# Patient Record
Sex: MALE | Race: WHITE | NOT HISPANIC OR LATINO
[De-identification: names, ages, dates, MRNs, and addresses within clinical notes are randomized per-mention and may not be internally consistent; named-entity substitution may affect disease eponyms.]

---

## 2022-11-08 ENCOUNTER — NON-APPOINTMENT (OUTPATIENT)
Age: 62
End: 2022-11-08

## 2022-11-08 ENCOUNTER — APPOINTMENT (OUTPATIENT)
Dept: OTOLARYNGOLOGY | Facility: CLINIC | Age: 62
End: 2022-11-08

## 2022-11-08 VITALS
HEIGHT: 72 IN | HEART RATE: 84 BPM | WEIGHT: 237 LBS | BODY MASS INDEX: 32.1 KG/M2 | SYSTOLIC BLOOD PRESSURE: 146 MMHG | DIASTOLIC BLOOD PRESSURE: 89 MMHG

## 2022-11-08 DIAGNOSIS — J03.90 ACUTE TONSILLITIS, UNSPECIFIED: ICD-10-CM

## 2022-11-08 DIAGNOSIS — Z86.79 PERSONAL HISTORY OF OTHER DISEASES OF THE CIRCULATORY SYSTEM: ICD-10-CM

## 2022-11-08 DIAGNOSIS — R13.10 DYSPHAGIA, UNSPECIFIED: ICD-10-CM

## 2022-11-08 PROCEDURE — 99203 OFFICE O/P NEW LOW 30 MIN: CPT | Mod: 25

## 2022-11-08 PROCEDURE — 31575 DIAGNOSTIC LARYNGOSCOPY: CPT

## 2022-11-08 RX ORDER — SULFAMETHOXAZOLE AND TRIMETHOPRIM 800; 160 MG/1; MG/1
800-160 TABLET ORAL
Qty: 14 | Refills: 0 | Status: ACTIVE | COMMUNITY
Start: 2022-08-05

## 2022-11-08 RX ORDER — ALBUTEROL SULFATE 90 UG/1
108 (90 BASE) INHALANT RESPIRATORY (INHALATION)
Qty: 7 | Refills: 0 | Status: ACTIVE | COMMUNITY
Start: 2022-10-31

## 2022-11-08 RX ORDER — CLINDAMYCIN HYDROCHLORIDE 300 MG/1
300 CAPSULE ORAL
Qty: 30 | Refills: 0 | Status: ACTIVE | COMMUNITY
Start: 2022-11-08

## 2022-11-08 RX ORDER — AMOXICILLIN AND CLAVULANATE POTASSIUM 875; 125 MG/1; MG/1
875-125 TABLET, COATED ORAL
Qty: 20 | Refills: 0 | Status: ACTIVE | COMMUNITY
Start: 2022-10-09

## 2022-11-08 RX ORDER — PREDNISONE 10 MG/1
10 TABLET ORAL
Qty: 20 | Refills: 0 | Status: ACTIVE | COMMUNITY
Start: 2022-10-31

## 2022-11-08 RX ORDER — MOLNUPIRAVIR 200 MG/1
200 CAPSULE ORAL
Qty: 40 | Refills: 0 | Status: ACTIVE | COMMUNITY
Start: 2022-07-20

## 2022-11-08 RX ORDER — PREDNISONE 20 MG/1
20 TABLET ORAL
Qty: 8 | Refills: 0 | Status: ACTIVE | COMMUNITY
Start: 2022-10-09

## 2022-11-08 RX ORDER — METHYLPREDNISOLONE 4 MG/1
4 TABLET ORAL
Qty: 1 | Refills: 0 | Status: ACTIVE | COMMUNITY
Start: 2022-11-08 | End: 1900-01-01

## 2022-11-08 RX ORDER — CLOPIDOGREL BISULFATE 75 MG/1
75 TABLET, FILM COATED ORAL
Qty: 90 | Refills: 0 | Status: ACTIVE | COMMUNITY
Start: 2021-10-11

## 2022-11-08 RX ORDER — BENZONATATE 200 MG/1
200 CAPSULE ORAL
Qty: 30 | Refills: 0 | Status: ACTIVE | COMMUNITY
Start: 2022-10-31

## 2022-11-08 RX ORDER — PROMETHAZINE HYDROCHLORIDE AND DEXTROMETHORPHAN HYDROBROMIDE ORAL SOLUTION 15; 6.25 MG/5ML; MG/5ML
6.25-15 SOLUTION ORAL
Qty: 100 | Refills: 0 | Status: ACTIVE | COMMUNITY
Start: 2022-10-24

## 2022-11-08 RX ORDER — AZELASTINE HYDROCHLORIDE 137 UG/1
137 SPRAY, METERED NASAL
Qty: 30 | Refills: 0 | Status: ACTIVE | COMMUNITY
Start: 2022-07-20

## 2022-11-08 RX ORDER — METOPROLOL SUCCINATE 25 MG/1
25 TABLET, EXTENDED RELEASE ORAL
Qty: 90 | Refills: 0 | Status: ACTIVE | COMMUNITY
Start: 2022-11-05

## 2022-11-08 RX ORDER — ROSUVASTATIN CALCIUM 10 MG/1
10 TABLET, FILM COATED ORAL
Qty: 90 | Refills: 0 | Status: ACTIVE | COMMUNITY
Start: 2022-07-28

## 2022-11-08 NOTE — REVIEW OF SYSTEMS
[Seasonal Allergies] : seasonal allergies [Nasal Congestion] : nasal congestion [Recurrent Sinus Infections] : recurrent sinus infections [Problem Snoring] : problem snoring [Sinus Pressure] : sinus pressure [Throat Clearing] : throat clearing [Throat Pain] : throat pain [Wheezing] : wheezing [Cough] : cough [Swollen Glands] : swollen glands [Negative] : Endocrine [FreeTextEntry7] : difficulty swallowing  [FreeTextEntry9] : Muscle aches

## 2022-11-08 NOTE — ASSESSMENT
[FreeTextEntry1] : Mumtaz Partida presents for evaluation of acute tonsillitis. He was started on clindamycin by urgent care. He has an enlarged erythematous right tonsil. Given his singificant symptoms and difficult oral exam, flexible laryngoscopy was performed showing right tonsillar hypertrophy. No evidence of abscess on exam.\par \par - Complete clindamycin course.\par - Medrol dose pack. The potential side effects of high-dose steroid use were discussed at length. These risks include but are not limited to: increased appetite, insomnia, fluid retention, mood swings, weight gain, change in blood pressure, high blood glucose, possible adrenal suppression, osteoporosis, avascular necrosis of the hip, menstrual irregularities (if applicable), and cataracts\par - Follow up in 2 weeks.

## 2022-11-08 NOTE — HISTORY OF PRESENT ILLNESS
[de-identified] : Mumtaz Partida is a 61 yo male with hx CAD s/p stent placement, previous vocal cord polyp s/p excisoin who presents for evaluation of throat pain. 3 days ago, he had irritation of his throat. He was seen by urgent care the following day. He was already on prednisone for bronchitis and had just completed a course of amoxicillin. He was not started on another course of antibiotics at that time. He continued to have sore throat and was seen today, prescribed clindamycin. Strep tests have been negative. He denies dysphagia but notes odynophagia. He notes some dyspnea, worse when lying flat. He notes that his voice is muffled. He denies fevers or chills. He notes recurrent tonsillitis, at least three in the past year. He denies history of peritonsillar abscess.

## 2022-11-08 NOTE — PHYSICAL EXAM
[Midline] : trachea located in midline position [Laryngoscopy Performed] : laryngoscopy was performed, see procedure section for findings [de-identified] : Enlarged erythematous right tonsil. Left tonsil 2+. [Normal] : no rashes

## 2022-11-21 ENCOUNTER — APPOINTMENT (OUTPATIENT)
Dept: OTOLARYNGOLOGY | Facility: CLINIC | Age: 62
End: 2022-11-21

## 2022-11-21 VITALS
DIASTOLIC BLOOD PRESSURE: 91 MMHG | BODY MASS INDEX: 31.83 KG/M2 | HEIGHT: 72 IN | SYSTOLIC BLOOD PRESSURE: 136 MMHG | HEART RATE: 96 BPM | WEIGHT: 235 LBS

## 2022-11-21 DIAGNOSIS — K13.79 OTHER LESIONS OF ORAL MUCOSA: ICD-10-CM

## 2022-11-21 DIAGNOSIS — R13.12 DYSPHAGIA, OROPHARYNGEAL PHASE: ICD-10-CM

## 2022-11-21 DIAGNOSIS — J35.1 HYPERTROPHY OF TONSILS: ICD-10-CM

## 2022-11-21 DIAGNOSIS — R06.83 SNORING: ICD-10-CM

## 2022-11-21 PROCEDURE — 99214 OFFICE O/P EST MOD 30 MIN: CPT | Mod: 25

## 2022-11-21 PROCEDURE — 31575 DIAGNOSTIC LARYNGOSCOPY: CPT

## 2022-11-21 NOTE — PHYSICAL EXAM
[Midline] : trachea located in midline position [Normal] : no rashes [de-identified] : submandibular glands slightly enlarged but soft [de-identified] : mildly inflamed turbinates [de-identified] : vickie cordoba [de-identified] : asymmetric tonsils, right large than left. Although they look normal

## 2022-11-21 NOTE — ASSESSMENT
[FreeTextEntry1] : Reviewed and reconciled medications, allergies, PMHx, PSHx, SocHx, FMHx \par \par Pt. with hx of CAD s/p stent placement, previous vocal cord polyp s/p excisoin, and throat pain. Patient presents stating that over the weekend he had a feeling of a lump in the back of his throat - affecting his swallowing and breathing. He called Dr. ARDON and Dr. ARDON had him doing kosher salt water gargles. He denies any shell fish or beer. He states he has had tonsil problems forever.\par \par Physical Exam:\par -asymmetric tonsils, right large than left. Although they look normal \par -mildly inflamed turbinates \par -submandibular glands slightly enlarged but soft\par \par Flexible Laryngoscopy:\par -large swollen turbinates\par -narrowing at the level f the uvula which extends down BOT to epiglottis\par -right tonsil larger than left\par -epiglottis looks normal\par -larynx looks normal\par -BOT looks normal \par \par Plan: R/B/A of uvula cauterization verse tonsillectomy(if can get medical clearance to come off blood thinners for 2 weeks) discussed with patient. Next time it happens come in that day. Continue salt water gargles.

## 2022-11-21 NOTE — HISTORY OF PRESENT ILLNESS
[de-identified] : Pt. with hx of CAD s/p stent placement, previous vocal cord polyp s/p excisoin, and throat pain. Patient presents stating that over the weekend he had a feeling of a lump in the back of his throat - affecting his swallowing and breathing. He called Dr. ARDON and Dr. ARDON had him doing kosher salt water gargles. He denies any shell fish or beer. He states he has had tonsil problems forever.

## 2022-11-21 NOTE — PROCEDURE
[Complicated Symptoms] : complicated symptoms requiring more thorough examination than provided by mirror [de-identified] : Flexible Laryngoscopy:\par -large swollen turbinates\par -narrowing at the level f the uvula which extends down BOT to epiglottis\par -right tonsil larger than left\par -epiglottis looks normal\par -larynx looks normal\par -BOT looks normal  [de-identified] : check airway patency

## 2022-11-23 ENCOUNTER — APPOINTMENT (OUTPATIENT)
Dept: OTOLARYNGOLOGY | Facility: CLINIC | Age: 62
End: 2022-11-23

## 2022-11-28 DIAGNOSIS — J35.8 OTHER CHRONIC DISEASES OF TONSILS AND ADENOIDS: ICD-10-CM

## 2023-01-13 ENCOUNTER — NON-APPOINTMENT (OUTPATIENT)
Age: 63
End: 2023-01-13

## 2023-01-19 ENCOUNTER — APPOINTMENT (OUTPATIENT)
Dept: OTOLARYNGOLOGY | Facility: AMBULATORY MEDICAL SERVICES | Age: 63
End: 2023-01-19
Payer: COMMERCIAL

## 2023-01-19 ENCOUNTER — RESULT REVIEW (OUTPATIENT)
Age: 63
End: 2023-01-19

## 2023-01-19 DIAGNOSIS — G89.18 OTHER ACUTE POSTPROCEDURAL PAIN: ICD-10-CM

## 2023-01-19 DIAGNOSIS — Z98.890 OTHER SPECIFIED POSTPROCEDURAL STATES: ICD-10-CM

## 2023-01-19 PROCEDURE — 42140 EXCISION OF UVULA: CPT

## 2023-01-19 PROCEDURE — 42826 REMOVAL OF TONSILS: CPT

## 2023-01-19 RX ORDER — HYDROCODONE BITARTRATE AND ACETAMINOPHEN 7.5; 325 MG/15ML; MG/15ML
7.5-325 SOLUTION ORAL EVERY 6 HOURS
Qty: 420 | Refills: 0 | Status: ACTIVE | COMMUNITY
Start: 2023-01-19 | End: 1900-01-01

## 2023-01-19 RX ORDER — AMOXICILLIN 250 MG/5ML
250 POWDER, FOR SUSPENSION ORAL 3 TIMES DAILY
Qty: 2 | Refills: 0 | Status: ACTIVE | COMMUNITY
Start: 2023-01-19 | End: 1900-01-01

## 2023-02-10 ENCOUNTER — APPOINTMENT (OUTPATIENT)
Dept: OTOLARYNGOLOGY | Facility: CLINIC | Age: 63
End: 2023-02-10

## 2023-06-21 RX ORDER — CHLORHEXIDINE GLUCONATE 213 G/1000ML
1 SOLUTION TOPICAL ONCE
Refills: 0 | Status: DISCONTINUED | OUTPATIENT
Start: 2023-06-22 | End: 2023-07-06

## 2023-06-21 NOTE — H&P PST ADULT - HISTORY OF PRESENT ILLNESS
62M PMHx HTN; HLD; Obesity; GERD; Depression CAD s/p PCI to LAD 2008; NSTEMI 12/2010 had repeat PCI and stent placement for pLAD ISR; D2 in-stent [facility] with JUAN 3 flow. Repeat LHC performed at [facility] 2015 revealing LM: normal; pLAD with no significant restenosis; mLAD 40% stenosis; Cx: normal; RCA normal; had additional cardiac cath at [facility] in 8/2021 with unchanged disease.  TTE performed 6/2023 revealing LVEF 50-55%; mild MR; mild AI; mild LAE; mild to moderate LVH; ascending aorta 4.1cm. NST 6/2023 reveals inferior diaphragmatic attenuation, apical infarct with subtle gerardo infarct ischemia in some views, LVEF 36%. Patient reports feeling well lately and reports no angina/equivalents on mild activity as he is limited by knee pain. Due to his prior risk factors and LAD stents, he presents to [facility] for elective LHC for his upcoming surgery to delineate coronary anatomy.    Symptoms:        Angina (Class):        Ischemic Symptoms:     Heart Failure:        Systolic/Diastolic/Combined: n/a       NYHA Class (within 2 weeks): n/a    Assessment of LVEF (Must be within 6 months):       EF: 50-55%       Assessed by: TTE       Date: 6/15/23    Prior Cardiac Interventions:       PCI's (Date, Stents, Vessels): CAD s/p PCI to LAD 2008; NSTEMI 12/2010 had repeat PCI and stent placement for pLAD ISR; D2 in-stent USP with JUAN 3 flow. Repeat LHC performed at [facility] 2015 revealing LM: normal; pLAD with no significant restenosis; mLAD 40% stenosis; Cx: normal; RCA normal; had additional cardiac cath at [facility] in 8/2021 with unchanged disease.        CABG (Date, Grafts): n/a    Noninvasive Testing:   Stress Test: Date: 6/15/23       Protocol: Regadenoson NST       EKG Changes: none       Myocardial Imaging: Moderate sized inferior wall perfusion defect mildly reversible between rest and supine stress however improves on prone imaging and may suggest diaphragmatic attenuation. +small mild apical perfusion defect that is fixed but shows subtle reversibility in some views and may indicate small infarct with gerardo infarct ischemia. LVEF 36%.        Risk Assessment (Low, Medium, High): Medium    Echo (Date, Findings): TTE 6/15/23 EF 50-55%; Mild-moderate concentric hypertrophy; low normal global systolic function; RV is normal in size with preserved systolic function. LA mildly dilated; mild MR; mild AR; mild TR: Estimated PASP 26mmHg; no pericardial effusion seen; mild dilatation of ascending thoracic aorta 4.1cm.     Antianginal Therapies:        Beta Blockers:  Metoprolol Succinate ER 50mg PO daily       Calcium Channel Blockers: n/a       Long Acting Nitrates: n/a       Ranexa: n/a      Associated Risk Factors:        Cerebrovascular Disease: N/A       Chronic Lung Disease: N/A       Peripheral Arterial Disease: N/A       Chronic Kidney Disease (if yes, what is GFR): N/A       Uncontrolled Diabetes (if yes, what is HgbA1C or FBS): N/A       Poorly Controlled Hypertension (if yes, what is SBP): N/A       Morbid Obesity (if yes, what is BMI): N/A       History of Recent Ventricular Arrhythmia: N/A       Inability to Ambulate Safely: N/A       Need for Therapeutic Anticoagulation: N/A       Antiplatelet or Contrast Allergy: N/A     62M PMHx HTN; HLD; Obesity; GERD; Depression CAD s/p PCI to LAD 2008; NSTEMI 12/2010 had repeat PCI and stent placement for pLAD ISR; D2 in-stent residential with JUAN 3 flow. Repeat LHC performed at [facility] 2015 revealing LM: normal; pLAD with no significant restenosis; mLAD 40% stenosis; Cx: normal; RCA normal; had additional cardiac cath at [facility] in 8/2021 with unchanged disease.  TTE performed 6/2023 revealing LVEF 50-55%; mild MR; mild AI; mild LAE; mild to moderate LVH; ascending aorta 4.1cm. NST 6/2023 reveals inferior diaphragmatic attenuation, apical infarct with subtle gerardo infarct ischemia in some views, LVEF 36%. Patient reports feeling well lately and reports no angina/equivalents on mild activity as he is limited by knee pain. Due to his prior risk factors and LAD stents, he presents to [facility] for elective LHC for his upcoming surgery to delineate coronary anatomy.    Symptoms:        Angina (Class): 0       Ischemic Symptoms: none    Heart Failure:        Systolic/Diastolic/Combined: n/a       NYHA Class (within 2 weeks): n/a    Assessment of LVEF (Must be within 6 months):       EF: 50-55%       Assessed by: TTE       Date: 6/15/23    Prior Cardiac Interventions:       PCI's (Date, Stents, Vessels): CAD s/p PCI to LAD 2008; NSTEMI 12/2010 had repeat PCI and stent placement for pLAD ISR; D2 in-stent residential with JUAN 3 flow. Repeat LHC performed at [facility] 2015 revealing LM: normal; pLAD with no significant restenosis; mLAD 40% stenosis; Cx: normal; RCA normal; had additional cardiac cath at [facility] in 8/2021 with unchanged disease.        CABG (Date, Grafts): n/a    Noninvasive Testing:   Stress Test: Date: 6/15/23       Protocol: Regadenoson NST       EKG Changes: none       Myocardial Imaging: Moderate sized inferior wall perfusion defect mildly reversible between rest and supine stress however improves on prone imaging and may suggest diaphragmatic attenuation. +small mild apical perfusion defect that is fixed but shows subtle reversibility in some views and may indicate small infarct with gerardo infarct ischemia. LVEF 36%.        Risk Assessment (Low, Medium, High): Medium    Echo (Date, Findings): TTE 6/15/23 EF 50-55%; Mild-moderate concentric hypertrophy; low normal global systolic function; RV is normal in size with preserved systolic function. LA mildly dilated; mild MR; mild AR; mild TR: Estimated PASP 26mmHg; no pericardial effusion seen; mild dilatation of ascending thoracic aorta 4.1cm.     Antianginal Therapies:        Beta Blockers:  Metoprolol Succinate ER 50mg PO daily       Calcium Channel Blockers: n/a       Long Acting Nitrates: n/a       Ranexa: n/a      Associated Risk Factors:        Cerebrovascular Disease: N/A       Chronic Lung Disease: N/A       Peripheral Arterial Disease: N/A       Chronic Kidney Disease (if yes, what is GFR): N/A       Uncontrolled Diabetes (if yes, what is HgbA1C or FBS): N/A       Poorly Controlled Hypertension (if yes, what is SBP): N/A       Morbid Obesity (if yes, what is BMI): N/A       History of Recent Ventricular Arrhythmia: N/A       Inability to Ambulate Safely: N/A       Need for Therapeutic Anticoagulation: N/A       Antiplatelet or Contrast Allergy: N/A

## 2023-06-21 NOTE — H&P PST ADULT - NSICDXPASTMEDICALHX_GEN_ALL_CORE_FT
PAST MEDICAL HISTORY:  CAD (coronary artery disease)     Essential hypertension     HTN (hypertension)     Hyperlipidemia     Hyperlipidemia     MI (myocardial infarction)     Stented coronary artery two stents , one colasped and had to be re stented

## 2023-06-21 NOTE — H&P PST ADULT - NSICDXPASTSURGICALHX_GEN_ALL_CORE_FT
PAST SURGICAL HISTORY:  History of appendectomy     History of throat surgery     S/P coronary artery stent placement 5 years ago

## 2023-06-21 NOTE — H&P PST ADULT - ASSESSMENT
Impression:  62M PMHx HTN; HLD; Obesity; GERD; Depression CAD s/p PCI to LAD 2008; NSTEMI 12/2010 had repeat PCI and stent placement for pLAD ISR; D2 in-stent [facility] with JUAN 3 flow. Repeat LHC performed at [facility] 2015 revealing LM: normal; pLAD with no significant restenosis; mLAD 40% stenosis; Cx: normal; RCA normal; had additional cardiac cath at [facility] in 8/2021 with unchanged disease.  TTE performed 6/2023 revealing LVEF 50-55%; mild MR; mild AI; mild LAE; mild to moderate LVH; ascending aorta 4.1cm. NST 6/2023 reveals inferior diaphragmatic attenuation, apical infarct with subtle gerardo infarct ischemia in some views, LVEF 36%. Patient reports feeling well lately and reports no angina/equivalents on mild activity as he is limited by knee pain. Due to his prior risk factors and LAD stents, he presents to [facility] for elective LHC for his upcoming surgery to delineate coronary anatomy.      Risk Assessments:  ASA:  Mallampati:  GFR:   Cr:  BRA:    Plan:  -plan for LHC  -preferred access RRA versus RFA  -patient seen and examined  -confirmed appropriate NPO duration  -ECG and Labs reviewed  -Aspirin 81mg po pre-cath  -procedure discussed with patient; risks and benefits explained, questions answered  -consent obtained by attending IC  -0.9% NS 250cc bolus ordered to prevent JASON   Impression:  62M PMHx HTN; HLD; Obesity; GERD; Depression CAD s/p PCI to LAD 2008; NSTEMI 12/2010 had repeat PCI and stent placement for pLAD ISR; D2 in-stent alf with JUAN 3 flow. Repeat LHC performed at [facility] 2015 revealing LM: normal; pLAD with no significant restenosis; mLAD 40% stenosis; Cx: normal; RCA normal; had additional cardiac cath at [facility] in 8/2021 with unchanged disease.  TTE performed 6/2023 revealing LVEF 50-55%; mild MR; mild AI; mild LAE; mild to moderate LVH; ascending aorta 4.1cm. NST 6/2023 reveals inferior diaphragmatic attenuation, apical infarct with subtle gerardo infarct ischemia in some views, LVEF 36%. Patient reports feeling well lately and reports no angina/equivalents on mild activity as he is limited by knee pain. Due to his prior risk factors and LAD stents, he presents to [facility] for elective LHC for his upcoming surgery to delineate coronary anatomy.      Risk Assessments:  ASA: 3  Mallampati: 2  GFR: 98  Cr: 0.85  BRA: 0.7%    Plan:  -plan for LHC  -preferred access RRA versus RFA  -patient seen and examined  -confirmed appropriate NPO duration  -ECG and Labs reviewed  -Aspirin 81mg po pre-cath  -procedure discussed with patient; risks and benefits explained, questions answered  -consent obtained by attending IC  -0.9% NS 250cc bolus ordered to prevent JASON

## 2023-06-22 ENCOUNTER — OUTPATIENT (OUTPATIENT)
Dept: OUTPATIENT SERVICES | Facility: HOSPITAL | Age: 63
LOS: 1 days | Discharge: HOME | End: 2023-06-22
Payer: COMMERCIAL

## 2023-06-22 ENCOUNTER — TRANSCRIPTION ENCOUNTER (OUTPATIENT)
Age: 63
End: 2023-06-22

## 2023-06-22 VITALS
DIASTOLIC BLOOD PRESSURE: 70 MMHG | HEART RATE: 84 BPM | OXYGEN SATURATION: 94 % | RESPIRATION RATE: 17 BRPM | SYSTOLIC BLOOD PRESSURE: 155 MMHG

## 2023-06-22 VITALS
HEIGHT: 72 IN | HEART RATE: 75 BPM | SYSTOLIC BLOOD PRESSURE: 173 MMHG | RESPIRATION RATE: 17 BRPM | WEIGHT: 240.3 LBS | TEMPERATURE: 98 F | OXYGEN SATURATION: 95 % | DIASTOLIC BLOOD PRESSURE: 89 MMHG

## 2023-06-22 DIAGNOSIS — R07.9 CHEST PAIN, UNSPECIFIED: ICD-10-CM

## 2023-06-22 DIAGNOSIS — Z98.89 OTHER SPECIFIED POSTPROCEDURAL STATES: Chronic | ICD-10-CM

## 2023-06-22 DIAGNOSIS — Z95.5 PRESENCE OF CORONARY ANGIOPLASTY IMPLANT AND GRAFT: Chronic | ICD-10-CM

## 2023-06-22 LAB
ANION GAP SERPL CALC-SCNC: 9 MMOL/L — SIGNIFICANT CHANGE UP (ref 5–17)
BASOPHILS # BLD AUTO: 0.06 K/UL — SIGNIFICANT CHANGE UP (ref 0–0.2)
BASOPHILS NFR BLD AUTO: 0.9 % — SIGNIFICANT CHANGE UP (ref 0–2)
BUN SERPL-MCNC: 18.6 MG/DL — SIGNIFICANT CHANGE UP (ref 8–20)
CALCIUM SERPL-MCNC: 9.8 MG/DL — SIGNIFICANT CHANGE UP (ref 8.4–10.5)
CHLORIDE SERPL-SCNC: 102 MMOL/L — SIGNIFICANT CHANGE UP (ref 96–108)
CO2 SERPL-SCNC: 30 MMOL/L — HIGH (ref 22–29)
CREAT SERPL-MCNC: 0.85 MG/DL — SIGNIFICANT CHANGE UP (ref 0.5–1.3)
EGFR: 98 ML/MIN/1.73M2 — SIGNIFICANT CHANGE UP
EOSINOPHIL # BLD AUTO: 0.39 K/UL — SIGNIFICANT CHANGE UP (ref 0–0.5)
EOSINOPHIL NFR BLD AUTO: 5.9 % — SIGNIFICANT CHANGE UP (ref 0–6)
GLUCOSE SERPL-MCNC: 115 MG/DL — HIGH (ref 70–99)
HCT VFR BLD CALC: 50.3 % — HIGH (ref 39–50)
HGB BLD-MCNC: 17 G/DL — SIGNIFICANT CHANGE UP (ref 13–17)
IMM GRANULOCYTES NFR BLD AUTO: 0.3 % — SIGNIFICANT CHANGE UP (ref 0–0.9)
LYMPHOCYTES # BLD AUTO: 0.82 K/UL — LOW (ref 1–3.3)
LYMPHOCYTES # BLD AUTO: 12.4 % — LOW (ref 13–44)
MAGNESIUM SERPL-MCNC: 1.9 MG/DL — SIGNIFICANT CHANGE UP (ref 1.6–2.6)
MCHC RBC-ENTMCNC: 28.4 PG — SIGNIFICANT CHANGE UP (ref 27–34)
MCHC RBC-ENTMCNC: 33.8 GM/DL — SIGNIFICANT CHANGE UP (ref 32–36)
MCV RBC AUTO: 84 FL — SIGNIFICANT CHANGE UP (ref 80–100)
MONOCYTES # BLD AUTO: 0.5 K/UL — SIGNIFICANT CHANGE UP (ref 0–0.9)
MONOCYTES NFR BLD AUTO: 7.6 % — SIGNIFICANT CHANGE UP (ref 2–14)
NEUTROPHILS # BLD AUTO: 4.82 K/UL — SIGNIFICANT CHANGE UP (ref 1.8–7.4)
NEUTROPHILS NFR BLD AUTO: 72.9 % — SIGNIFICANT CHANGE UP (ref 43–77)
PLATELET # BLD AUTO: 202 K/UL — SIGNIFICANT CHANGE UP (ref 150–400)
POTASSIUM SERPL-MCNC: 4.7 MMOL/L — SIGNIFICANT CHANGE UP (ref 3.5–5.3)
POTASSIUM SERPL-SCNC: 4.7 MMOL/L — SIGNIFICANT CHANGE UP (ref 3.5–5.3)
RBC # BLD: 5.99 M/UL — HIGH (ref 4.2–5.8)
RBC # FLD: 11.9 % — SIGNIFICANT CHANGE UP (ref 10.3–14.5)
SODIUM SERPL-SCNC: 140 MMOL/L — SIGNIFICANT CHANGE UP (ref 135–145)
WBC # BLD: 6.61 K/UL — SIGNIFICANT CHANGE UP (ref 3.8–10.5)
WBC # FLD AUTO: 6.61 K/UL — SIGNIFICANT CHANGE UP (ref 3.8–10.5)

## 2023-06-22 PROCEDURE — 36415 COLL VENOUS BLD VENIPUNCTURE: CPT

## 2023-06-22 PROCEDURE — 93010 ELECTROCARDIOGRAM REPORT: CPT

## 2023-06-22 PROCEDURE — 93005 ELECTROCARDIOGRAM TRACING: CPT

## 2023-06-22 PROCEDURE — 85025 COMPLETE CBC W/AUTO DIFF WBC: CPT

## 2023-06-22 PROCEDURE — C1769: CPT

## 2023-06-22 PROCEDURE — 83735 ASSAY OF MAGNESIUM: CPT

## 2023-06-22 PROCEDURE — C1887: CPT

## 2023-06-22 PROCEDURE — 93458 L HRT ARTERY/VENTRICLE ANGIO: CPT

## 2023-06-22 PROCEDURE — C1894: CPT

## 2023-06-22 PROCEDURE — 80048 BASIC METABOLIC PNL TOTAL CA: CPT

## 2023-06-22 RX ORDER — ASPIRIN/CALCIUM CARB/MAGNESIUM 324 MG
81 TABLET ORAL DAILY
Refills: 0 | Status: DISCONTINUED | OUTPATIENT
Start: 2023-06-22 | End: 2023-07-06

## 2023-06-22 RX ORDER — CLOPIDOGREL BISULFATE 75 MG/1
75 TABLET, FILM COATED ORAL DAILY
Refills: 0 | Status: DISCONTINUED | OUTPATIENT
Start: 2023-06-22 | End: 2023-07-06

## 2023-06-22 RX ORDER — SODIUM CHLORIDE 9 MG/ML
250 INJECTION INTRAMUSCULAR; INTRAVENOUS; SUBCUTANEOUS
Refills: 0 | Status: DISCONTINUED | OUTPATIENT
Start: 2023-06-22 | End: 2023-07-06

## 2023-06-22 RX ORDER — ACETAMINOPHEN 500 MG
650 TABLET ORAL ONCE
Refills: 0 | Status: COMPLETED | OUTPATIENT
Start: 2023-06-22 | End: 2023-06-22

## 2023-06-22 RX ADMIN — Medication 650 MILLIGRAM(S): at 11:10

## 2023-06-22 RX ADMIN — CLOPIDOGREL BISULFATE 75 MILLIGRAM(S): 75 TABLET, FILM COATED ORAL at 09:14

## 2023-06-22 RX ADMIN — SODIUM CHLORIDE 250 MILLILITER(S): 9 INJECTION INTRAMUSCULAR; INTRAVENOUS; SUBCUTANEOUS at 13:15

## 2023-06-22 RX ADMIN — SODIUM CHLORIDE 250 MILLILITER(S): 9 INJECTION INTRAMUSCULAR; INTRAVENOUS; SUBCUTANEOUS at 11:15

## 2023-06-22 RX ADMIN — Medication 81 MILLIGRAM(S): at 09:14

## 2023-06-22 NOTE — DISCHARGE NOTE PROVIDER - NSDCMRMEDTOKEN_GEN_ALL_CORE_FT
aspirin 81 mg oral tablet: 1 tab(s) orally once a day  clopidogrel 75 mg oral tablet: 1 tab(s) orally once a day  Crestor 20 mg oral tablet: 1 tab(s) orally once a day (at bedtime)  metoprolol succinate 50 mg oral tablet, extended release: 1 tab(s) orally once a day

## 2023-06-22 NOTE — PROGRESS NOTE ADULT - ASSESSMENT
62M PMHx HTN; HLD; Obesity; GERD; Depression CAD s/p PCI to LAD 2008; NSTEMI 12/2010 had repeat PCI and stent placement for pLAD ISR; D2 in-stent [facility] with JAUN 3 flow. Repeat LHC performed at [facility] 2015 revealing LM: normal; pLAD with no significant restenosis; mLAD 40% stenosis; Cx: normal; RCA normal; had additional cardiac cath at [facility] in 8/2021 with unchanged disease.  TTE performed 6/2023 revealing LVEF 50-55%; mild MR; mild AI; mild LAE; mild to moderate LVH; ascending aorta 4.1cm. NST 6/2023 reveals inferior diaphragmatic attenuation, apical infarct with subtle gerardo infarct ischemia in some views, LVEF 36%. Patient reports feeling well lately and reports no angina/equivalents on mild activity as he is limited by knee pain. Due to his prior risk factors and LAD stents, he presents to [facility] for elective LHC for his upcoming surgery to delineate coronary anatomy.  Now s/p LHC via RRA with Dr. Casey: Nonobstructive CAD (see full report below).    -Bedrest x 1 hour post procedure until 1130 then OOB  -Remove radial band one hour post procedure at 1130  -If remains stable, may discharge to home at 1230  -Continue current med regimen  -Follow up with Dr. Lynch in one to two weeks  -ACtivity instructions discussed with patient verbal understanding

## 2023-06-22 NOTE — PROGRESS NOTE ADULT - SUBJECTIVE AND OBJECTIVE BOX
Interventional Cardiology NP post procedure note:     -s/p LHC via RRA with Dr. Casey: Nonobstructive CAD (see full report below)      MEDICATIONS  (STANDING):  aspirin  chewable 81 milliGRAM(s) Oral daily  chlorhexidine 4% Liquid 1 Application(s) Topical Once  clopidogrel Tablet 75 milliGRAM(s) Oral daily  sodium chloride 0.9%. 250 milliLiter(s) (250 mL/Hr) IV Continuous <Continuous>    Allergies:  No Known Allergies      PAST MEDICAL & SURGICAL HISTORY:  Hyperlipidemia      Essential hypertension      Stented coronary artery  two stents , one colasped and had to be re stented      HTN (hypertension)      Hyperlipidemia      MI (myocardial infarction)      CAD (coronary artery disease)      S/P coronary artery stent placement  5 years ago      History of appendectomy      History of throat surgery          Vital Signs Last 24 Hrs  T(C): 36.9 (2023 08:47), Max: 36.9 (2023 08:47)  T(F): 98.4 (2023 08:47), Max: 98.4 (2023 08:47)  HR: 84 (2023 12:30) (75 - 84)  BP: 157/88 (2023 12:00) (152/79 - 173/89)  BP(mean): --  RR: 17 (2023 12:30) (17 - 17)  SpO2: 94% (2023 12:30) (94% - 95%)    Parameters below as of 2023 12:30  Patient On (Oxygen Delivery Method): room air        Physical Exam:  Constitutional: NAD, AAOx3  Cardiovascular: +S1S2 RRR  Pulmonary: CTA b/l, unlabored  GI: soft NTND +BS  Extremities: no pedal edema, +distal pulses b/l  Neuro: non focal, REAVES x4  Procedure site: Right radial band in place; site benign without hematoma/bleeding; RUE warm/mobile/acyanotic; + right ulnar pulse    LABS:                        17.0   6.61  )-----------( 202      ( 2023 08:15 )             50.3     06-22    140  |  102  |  18.6  ----------------------------<  115<H>  4.7   |  30.0<H>  |  0.85    Ca    9.8      2023 08:15  Mg     1.9             Urinalysis Basic - ( 2023 08:15 )    Color: x / Appearance: x / SG: x / pH: x  Gluc: 115 mg/dL / Ketone: x  / Bili: x / Urobili: x   Blood: x / Protein: x / Nitrite: x   Leuk Esterase: x / RBC: x / WBC x   Sq Epi: x / Non Sq Epi: x / Bacteria: x        RADIOLOGY & ADDITIONAL TESTS:  < from: Cardiac Catheterization (23 @ 09:55) >  Study Date:     2023   Name:           RED RAYMOND   :            1960   (62 years)   Gender:         male   MR#:            [id number]   [facility]           [id number]   Patient Class:  Outpatient     Cath Lab Report    Diagnostic Cardiologist:       Tk Casey MD   Fellow:                        Rob Bonner   Referring Physician:           Madeleine Lynch MD   Referring Physician:           Mralene Amador MD     Procedures Performed   Procedures:              1.    Arterial Access - Right Radial     2.    Diagnostic Coronary Angiography   3.    Left Heart Cath     Indications:               Positive Stress Test   Lab Visit Indication:      stable known CAD     Diagnostic Conclusions:   Non Obstructive Coronary Artery Disease       Acute complication:    No complications     Hemodynamic:           Hemodynamic Impressions   General Impressions: Hemodynamic assessment demonstrates mildly  elevated LVEDP.    Procedure Narrative:   The risks and alternatives of the procedures and conscious sedation  were explained to the patient and informed consent was  obtained. The patient was brought to the cath lab and placed on the  exam table.  Access   Right radial artery:   Vascular access was obtained using modified seldinger technique.  Hemostasis/Sheath Status: Hemostasis was successful  using mechanical compression.      Coronary Angiography   Left Coronary System:   A catheter was positioned into the vessel ostia under fluoroscopic  guidance. Angiograms were obtained in multiple views.  Right Coronary System:   A catheter was positioned into the vessel ostia under fluoroscopic  guidance. Angiograms were obtained in multiple views.    Diagnostic Findings:     Coronary Angiography   The coronary circulation isleft dominant.      Patient: RED RAYMOND           MRN: [id number]  Study Date: 2023   09:55 AM      Page 1 of 4          LM   Left main artery: Angiography shows no disease.      LAD   Left anterior descending artery: Angiography shows minor  irregularities. No significant restenosis.. First diagonal: There is a  50  % stenosis in the ostium portion of the segment.      CX   Circumflex: Angiography shows minor irregularities.      RCA   Right coronary artery: Angiography shows minor irregularities.      Left Heart Cath   LHC performed: Aortic valve crossed and left ventricular pressures  were obtained.    < end of copied text >

## 2023-06-22 NOTE — DISCHARGE NOTE PROVIDER - NSDCCPCAREPLAN_GEN_ALL_CORE_FT
PRINCIPAL DISCHARGE DIAGNOSIS  Diagnosis: CAD (coronary artery disease)  Assessment and Plan of Treatment: Medical management

## 2023-06-22 NOTE — DISCHARGE NOTE NURSING/CASE MANAGEMENT/SOCIAL WORK - NSDCPEFALRISK_GEN_ALL_CORE
For information on Fall & Injury Prevention, visit: [email or web address] OR  [email or web address] OR  https://www.cdc.gov/steadi/patient.html

## 2023-06-22 NOTE — DISCHARGE NOTE NURSING/CASE MANAGEMENT/SOCIAL WORK - PATIENT PORTAL LINK FT
You can access the FollowMyHealth Patient Portal offered by [facility] by registering at the following website: [email or web address]. By joining [email or web address] FollowMyHealth portal, you will also be able to view your health information using other applications (apps) compatible with our system.

## 2023-06-27 DIAGNOSIS — I25.10 ATHEROSCLEROTIC HEART DISEASE OF NATIVE CORONARY ARTERY WITHOUT ANGINA PECTORIS: ICD-10-CM

## 2024-04-22 ENCOUNTER — EMERGENCY (EMERGENCY)
Facility: HOSPITAL | Age: 64
LOS: 1 days | Discharge: HOME | End: 2024-04-22
Attending: EMERGENCY MEDICINE
Payer: COMMERCIAL

## 2024-04-22 VITALS
HEART RATE: 110 BPM | SYSTOLIC BLOOD PRESSURE: 163 MMHG | TEMPERATURE: 98 F | DIASTOLIC BLOOD PRESSURE: 105 MMHG | OXYGEN SATURATION: 97 % | RESPIRATION RATE: 20 BRPM

## 2024-04-22 VITALS
TEMPERATURE: 98 F | SYSTOLIC BLOOD PRESSURE: 173 MMHG | HEART RATE: 101 BPM | DIASTOLIC BLOOD PRESSURE: 84 MMHG | OXYGEN SATURATION: 98 % | WEIGHT: 274.92 LBS | RESPIRATION RATE: 20 BRPM

## 2024-04-22 DIAGNOSIS — Z98.89 OTHER SPECIFIED POSTPROCEDURAL STATES: Chronic | ICD-10-CM

## 2024-04-22 DIAGNOSIS — Z95.5 PRESENCE OF CORONARY ANGIOPLASTY IMPLANT AND GRAFT: Chronic | ICD-10-CM

## 2024-04-22 PROBLEM — I25.10 ATHEROSCLEROTIC HEART DISEASE OF NATIVE CORONARY ARTERY WITHOUT ANGINA PECTORIS: Chronic | Status: ACTIVE | Noted: 2023-06-21

## 2024-04-22 LAB
ANION GAP SERPL CALC-SCNC: 13 MMOL/L — SIGNIFICANT CHANGE UP (ref 5–17)
BASOPHILS # BLD AUTO: 0.08 K/UL — SIGNIFICANT CHANGE UP (ref 0–0.2)
BASOPHILS NFR BLD AUTO: 1 % — SIGNIFICANT CHANGE UP (ref 0–2)
BUN SERPL-MCNC: 7.1 MG/DL — LOW (ref 8–20)
CALCIUM SERPL-MCNC: 9.8 MG/DL — SIGNIFICANT CHANGE UP (ref 8.4–10.5)
CHLORIDE SERPL-SCNC: 102 MMOL/L — SIGNIFICANT CHANGE UP (ref 96–108)
CO2 SERPL-SCNC: 29 MMOL/L — SIGNIFICANT CHANGE UP (ref 22–29)
CREAT SERPL-MCNC: 0.69 MG/DL — SIGNIFICANT CHANGE UP (ref 0.5–1.3)
EGFR: 104 ML/MIN/1.73M2 — SIGNIFICANT CHANGE UP
EOSINOPHIL # BLD AUTO: 0.4 K/UL — SIGNIFICANT CHANGE UP (ref 0–0.5)
EOSINOPHIL NFR BLD AUTO: 5.2 % — SIGNIFICANT CHANGE UP (ref 0–6)
GLUCOSE SERPL-MCNC: 100 MG/DL — HIGH (ref 70–99)
HCT VFR BLD CALC: 51 % — HIGH (ref 39–50)
HGB BLD-MCNC: 16.8 G/DL — SIGNIFICANT CHANGE UP (ref 13–17)
IMM GRANULOCYTES NFR BLD AUTO: 2 % — HIGH (ref 0–0.9)
LYMPHOCYTES # BLD AUTO: 1.29 K/UL — SIGNIFICANT CHANGE UP (ref 1–3.3)
LYMPHOCYTES # BLD AUTO: 16.8 % — SIGNIFICANT CHANGE UP (ref 13–44)
MCHC RBC-ENTMCNC: 28.5 PG — SIGNIFICANT CHANGE UP (ref 27–34)
MCHC RBC-ENTMCNC: 32.9 GM/DL — SIGNIFICANT CHANGE UP (ref 32–36)
MCV RBC AUTO: 86.6 FL — SIGNIFICANT CHANGE UP (ref 80–100)
MONOCYTES # BLD AUTO: 0.64 K/UL — SIGNIFICANT CHANGE UP (ref 0–0.9)
MONOCYTES NFR BLD AUTO: 8.4 % — SIGNIFICANT CHANGE UP (ref 2–14)
NEUTROPHILS # BLD AUTO: 5.1 K/UL — SIGNIFICANT CHANGE UP (ref 1.8–7.4)
NEUTROPHILS NFR BLD AUTO: 66.6 % — SIGNIFICANT CHANGE UP (ref 43–77)
PLATELET # BLD AUTO: 276 K/UL — SIGNIFICANT CHANGE UP (ref 150–400)
POTASSIUM SERPL-MCNC: 4.3 MMOL/L — SIGNIFICANT CHANGE UP (ref 3.5–5.3)
POTASSIUM SERPL-SCNC: 4.3 MMOL/L — SIGNIFICANT CHANGE UP (ref 3.5–5.3)
RBC # BLD: 5.89 M/UL — HIGH (ref 4.2–5.8)
RBC # FLD: 13.8 % — SIGNIFICANT CHANGE UP (ref 10.3–14.5)
SODIUM SERPL-SCNC: 144 MMOL/L — SIGNIFICANT CHANGE UP (ref 135–145)
WBC # BLD: 7.66 K/UL — SIGNIFICANT CHANGE UP (ref 3.8–10.5)
WBC # FLD AUTO: 7.66 K/UL — SIGNIFICANT CHANGE UP (ref 3.8–10.5)

## 2024-04-22 PROCEDURE — 82962 GLUCOSE BLOOD TEST: CPT

## 2024-04-22 PROCEDURE — 99284 EMERGENCY DEPT VISIT MOD MDM: CPT

## 2024-04-22 PROCEDURE — 36415 COLL VENOUS BLD VENIPUNCTURE: CPT

## 2024-04-22 PROCEDURE — 70450 CT HEAD/BRAIN W/O DYE: CPT | Mod: MC

## 2024-04-22 PROCEDURE — 74176 CT ABD & PELVIS W/O CONTRAST: CPT | Mod: MC

## 2024-04-22 PROCEDURE — 71250 CT THORAX DX C-: CPT | Mod: MC

## 2024-04-22 PROCEDURE — 85025 COMPLETE CBC W/AUTO DIFF WBC: CPT

## 2024-04-22 PROCEDURE — 99284 EMERGENCY DEPT VISIT MOD MDM: CPT | Mod: 25

## 2024-04-22 PROCEDURE — 72125 CT NECK SPINE W/O DYE: CPT | Mod: MC

## 2024-04-22 PROCEDURE — 80048 BASIC METABOLIC PNL TOTAL CA: CPT

## 2024-04-22 PROCEDURE — 70450 CT HEAD/BRAIN W/O DYE: CPT | Mod: 26,MC

## 2024-04-22 PROCEDURE — 74176 CT ABD & PELVIS W/O CONTRAST: CPT | Mod: 26,MC

## 2024-04-22 PROCEDURE — 71250 CT THORAX DX C-: CPT | Mod: 26,MC

## 2024-04-22 PROCEDURE — 72125 CT NECK SPINE W/O DYE: CPT | Mod: 26,MC

## 2024-04-22 RX ORDER — OFLOXACIN 0.3 %
1 DROPS OPHTHALMIC (EYE)
Refills: 0 | Status: DISCONTINUED | OUTPATIENT
Start: 2024-04-22 | End: 2024-04-29

## 2024-04-22 RX ORDER — SODIUM CHLORIDE 9 MG/ML
1000 INJECTION INTRAMUSCULAR; INTRAVENOUS; SUBCUTANEOUS ONCE
Refills: 0 | Status: COMPLETED | OUTPATIENT
Start: 2024-04-22 | End: 2024-04-22

## 2024-04-22 RX ADMIN — Medication 1 DROP(S): at 13:32

## 2024-04-22 RX ADMIN — SODIUM CHLORIDE 1000 MILLILITER(S): 9 INJECTION INTRAMUSCULAR; INTRAVENOUS; SUBCUTANEOUS at 11:12
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
## 2024-04-22 NOTE — ED PROVIDER NOTE - NSFOLLOWUPINSTRUCTIONS_ED_ALL_ED_FT
Please review your CT imaging with your primary care physician for further outpatient management including outpatient CT.    Tiny soft tissue density left  mainstem bronchus may represent adherent mucus.  small polypoid lesion not excluded.     Please follow-up with your primary care doctor.  Please call for an appointment in the next 48 hours but if you cannot follow-up with your primary care doctor please return to the Emergency Department for any urgent issues.    You were given a copy of the tests performed today.  Please bring the results with you and review them with your primary care doctor.    If you have any worsening of symptoms or any other concerns please return to the Emergency Department immediately.    Please continue taking your home medications as directed.    Contusion    A contusion is a deep bruise. Contusions are the result of a blunt injury to tissues and muscle fibers under the skin. The skin overlying the contusion may turn blue, purple, or yellow. Symptoms also include pain and swelling in the injured area.    SEEK IMMEDIATE MEDICAL CARE IF YOU HAVE ANY OF THE FOLLOWING SYMPTOMS: severe pain, numbness, tingling, pain, weakness, or skin color/temperature change in any part of your body distal to the injury.
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
## 2024-04-22 NOTE — ED PROVIDER NOTE - CARE PROVIDER_API CALL
Butch Gallego  Ophthalmology  [address] [place] [address], [facility] [address]  [place] [address]  Phone: [phone]  Fax: [phone]  Follow Up Time: 4-6 Days
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
## 2024-04-22 NOTE — ED PROVIDER NOTE - PROGRESS NOTE DETAILS
Stable on reassessment, clinically sober.  Denies any symptoms.  Denies any vision loss or focal weakness.  Results reviewed with patient.  Patient agrees with discharge and ophthalmology follow-up.  Discussed incidental findings on CT chest.  Return precautions discussed -Anna, DO
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
## 2024-04-22 NOTE — ED ADULT TRIAGE NOTE - WEIGHT IN KG
Problem: Knowledge Deficit  Goal: Knowledge of disease process/condition, treatment plan, diagnostic tests, and medications will improve  Outcome: PROGRESSING AS EXPECTED  Pt verbalizes understanding of all procedures and treatments.    Problem: Psychosocial Needs:  Goal: Level of anxiety will decrease  Outcome: PROGRESSING SLOWER THAN EXPECTED  Pt has required some anxiolytics during the shift, d/t anxiety from scheduled heart cath.         124.7

## 2024-04-22 NOTE — ED PROVIDER NOTE - PATIENT PORTAL LINK FT
You can access the FollowMyHealth Patient Portal offered by [facility] by registering at the following website: [email or web address]. By joining [employer]’s FollowMyHealth portal, you will also be able to view your health information using other applications (apps) compatible with our system.
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
## 2024-10-21 NOTE — ED PROVIDER NOTE - NSCAREINITIATED _GEN_ER
I spoke with the patient and he stated that he did follow up with his doctor back home and everything is good.  
Luca Bright(Attending)

## 2025-07-22 ENCOUNTER — TRANSCRIPTION ENCOUNTER (OUTPATIENT)
Age: 65
End: 2025-07-22